# Patient Record
Sex: FEMALE | Race: WHITE
[De-identification: names, ages, dates, MRNs, and addresses within clinical notes are randomized per-mention and may not be internally consistent; named-entity substitution may affect disease eponyms.]

---

## 2017-10-12 ENCOUNTER — HOSPITAL ENCOUNTER (OUTPATIENT)
Dept: HOSPITAL 47 - RADMAMWWP | Age: 62
Discharge: HOME | End: 2017-10-12
Payer: COMMERCIAL

## 2017-10-12 DIAGNOSIS — Z12.31: Primary | ICD-10-CM

## 2017-10-16 NOTE — MM
Reason for exam: screening  (asymptomatic).

Last mammogram was performed 2 years and 10 months ago.



History:

Patient is postmenopausal.

Benign US biopsy breast VAD RT of the right breast, December 15, 2014.  

Stereotactic core biopsy, 2003.  Benign excisional biopsy of the right breast, 

1990.



Physical Findings:

A clinical breast exam by your physician is recommended on an annual basis and 

results should be correlated with mammographic findings.



MG Screening Mammo w CAD

Bilateral CC and MLO view(s) were taken.

Prior study comparison: December 10, 2014, bilateral MG diagnostic mammo w CAD 

[facility].  August 6, 2013, CAD bilateral diagnostic mammogram.

There are scattered fibroglandular densities.  Previous mammotome biopsy in the 

right breast. There is chronic nodularity in the right breast. Nodularity in the 

right upper outer quadrant appears slightly more defined.





ASSESSMENT: Incomplete: need additional imaging evaluation, BI-RAD 0



RECOMMENDATION:

Special view mammogram and ultrasound of the right breast.



[facility] will attempt to contact patient to return for supplemental 

views and ultrasound.

## 2017-10-19 ENCOUNTER — HOSPITAL ENCOUNTER (OUTPATIENT)
Dept: HOSPITAL 47 - RADMAMWWP | Age: 62
End: 2017-10-19
Payer: COMMERCIAL

## 2017-10-19 DIAGNOSIS — R92.8: Primary | ICD-10-CM

## 2017-10-19 PROCEDURE — 76642 ULTRASOUND BREAST LIMITED: CPT

## 2017-10-19 NOTE — MM
Reason for exam: additional evaluation requested from abnormal screening.

Last mammogram was performed less than 1 month ago.



History:

Patient is postmenopausal.

Benign US biopsy breast VAD RT of the right breast, December 15, 2014.  

Stereotactic core biopsy, 2003.  Benign excisional biopsy of the right breast, 

1990.



Physical Findings:

Nurse Summary: 2cm nodule in the right breast at 5 o'clock (nurse shannon).



MG Work Up Mamm w CAD RT

Spot compression CC, spot compression MLO, and LM view(s) were taken of the right 

breast.

Prior study comparison: October 12, 2017, bilateral MG screening mammo w CAD.  

December 10, 2014, bilateral MG diagnostic mammo w CAD MARISSA.

Previous mammotome biopsy in the right breast. There is chronic nodularity in the 

right breast, maybe better defined with improved technique.

No significant new findings when compared with previous films.



These results were verbally communicated with the patient and result sheet given 

to the patient on 10/19/17.





ASSESSMENT: Probably benign, BI-RAD 3



RECOMMENDATION:

Follow-up diagnostic mammogram and ultrasound of the right breast in 6 months.

## 2017-10-19 NOTE — USB
Reason for exam: additional evaluation requested from abnormal screening.



History:

Patient is postmenopausal.

Benign US biopsy breast VAD RT of the right breast, December 15, 2014.  

Stereotactic core biopsy, 2003.  Benign excisional biopsy of the right breast, 

1990.



US Breast Workup Limited RT

Right breast ultrasound demonstrates two oval, mixed, hypoechoic lesions at 9 

o'clock measuring 7 x 3 x 6mm and 7 x 3 x 6mm.



These results were verbally communicated with the patient and result sheet given 

to the patient on 10/19/17.





ASSESSMENT: Probably benign, BI-RAD 3



RECOMMENDATION:

Follow-up diagnostic mammogram and ultrasound of the right breast in 6 months.

## 2020-12-04 ENCOUNTER — HOSPITAL ENCOUNTER (OUTPATIENT)
Dept: HOSPITAL 47 - RADMAMWWP | Age: 65
Discharge: HOME | End: 2020-12-04
Attending: INTERNAL MEDICINE
Payer: COMMERCIAL

## 2020-12-04 DIAGNOSIS — N63.10: Primary | ICD-10-CM

## 2020-12-04 DIAGNOSIS — N63.20: ICD-10-CM

## 2020-12-04 PROCEDURE — 77066 DX MAMMO INCL CAD BI: CPT

## 2020-12-04 NOTE — MM
Reason for exam: additional evaluation requested from prior study.

Last mammogram was performed 3 years and 2 months ago.



History:

Patient is postmenopausal.

Benign US biopsy breast VAD RT of the right breast, December 15, 2014.  

Stereotactic core biopsy, 2003.  Benign excisional biopsy of the right breast, 

1990.



Physical Findings:

Nurse Summary: 2.5cm nodule in the right breast at 4 o'clock (nurse TM).



MG Diagnostic Mammo w CAD [facility]ISSA

Bilateral CC and MLO view(s) were taken.  XCCL view(s) were taken of the right 

breast.

Prior study comparison: October 19, 2017, right breast MG work up mamm w CAD RT.  

October 12, 2017, bilateral MG screening mammo w CAD.

The breast tissue is heterogeneously dense. This may lower the sensitivity of 

mammography.  Previous mammotome biopsy in the right breast, large nodule with 

dystrophic calcifications, corresponds to palpable. There is chronic nodularity in

the right breast. Asymmetric breast tissue left axilla, stable. There is no 

discrete abnormality.



These results were verbally communicated with the patient and result sheet given 

to the patient on 12/4/20.





ASSESSMENT: Benign, BI-RAD 2



RECOMMENDATION:

Routine screening mammogram of both breasts in 1 year.

## 2023-01-25 ENCOUNTER — HOSPITAL ENCOUNTER (OUTPATIENT)
Dept: HOSPITAL 47 - RADBDWWP | Age: 68
Discharge: HOME | End: 2023-01-25
Attending: INTERNAL MEDICINE
Payer: MEDICARE

## 2023-01-25 DIAGNOSIS — Z13.820: Primary | ICD-10-CM

## 2023-01-25 DIAGNOSIS — M06.9: ICD-10-CM

## 2023-01-25 DIAGNOSIS — K21.9: ICD-10-CM

## 2023-01-25 PROCEDURE — 77080 DXA BONE DENSITY AXIAL: CPT

## 2023-01-25 NOTE — BD
EXAMINATION TYPE: Axial Bone Density

 

DATE OF EXAM: 1/25/2023

 

COMPARISON: 08.06.2013

 

CLINICAL HISTORY: 67 years year old Female.  ICD-10 CODE: Z13.820 SCREENING FOR OSTEO

 

Height:  66

Weight:  214

 

FRAX RISK QUESTIONS:

Family History (Parent hip fracture):  YES

Rheumatoid Arthritis: YES

 

RISK FACTORS 

HISTORY OF: 

Family History of Osteoporosis: YES, WITH FX FEMUR

Postmenopausal woman: YES, AT 51 YRS OLD

Hyperparathyroidism: NO

Adrenal Insufficiency: NO

 

MEDICATIONS: 

Prednisone or other steroids: ON AND OFF FOR RA

Additional Medications: METHOTREXATE, REFLUX MEDS, VIT D AND CALCIUM

Additional History: RA, REFLUX,  

 

EXAM MEASUREMENTS: 

Bone mineral densitometry was performed using the Fancred System.

Bone mineral density as measured about the Lumbar spine is:  

----- L1-L4(G/cm2): 1.204

T Score Values are as follows:

----- L1:    0.2

----- L2:    0.0

----- L3:   -0.2

----- L4:    0.7

----- L1-L4:    0.2

Bone mineral density has: Decreased -2.7% SINCE....08.06.2013

 

Bone mineral density about the R hip (g/cm2): 0.936

Bone mineral density about the L hip (g/cm2):  0.949

T Score values are as follows:

-----R Neck:   -0.8

-----L Neck:   -0.9

-----R Total:   -0.6

-----L Total:   -0.5

Bone mineral density has: Decreased -5.5%   SINCE:  08.06.2013

 

FRAX%s: The graph provided illustrates a 16.5%nce for a major osteoporotic fx and a 1.8%ance for the 
hips probability for fx in 10 years time.

 

IMPRESSION:

Normal (Values between +1 and -1 indicate normal bone mass).  Consider repeating this study in 5 year
s or sooner if there is some new clinical indication.

 

NOTE:  T-SCORE=SD OF THE YOUNG ADULT MEAN.

## 2023-04-19 ENCOUNTER — HOSPITAL ENCOUNTER (EMERGENCY)
Dept: HOSPITAL 47 - EC | Age: 68
Discharge: HOME | End: 2023-04-19
Payer: MEDICARE

## 2023-04-19 VITALS — RESPIRATION RATE: 14 BRPM

## 2023-04-19 VITALS — SYSTOLIC BLOOD PRESSURE: 144 MMHG | HEART RATE: 74 BPM | DIASTOLIC BLOOD PRESSURE: 85 MMHG

## 2023-04-19 VITALS — TEMPERATURE: 98.1 F

## 2023-04-19 DIAGNOSIS — Z88.1: ICD-10-CM

## 2023-04-19 DIAGNOSIS — Z79.899: ICD-10-CM

## 2023-04-19 DIAGNOSIS — M06.9: ICD-10-CM

## 2023-04-19 DIAGNOSIS — I10: Primary | ICD-10-CM

## 2023-04-19 LAB
ALBUMIN SERPL-MCNC: 4.4 G/DL (ref 3.5–5)
ALP SERPL-CCNC: 73 U/L (ref 38–126)
ALT SERPL-CCNC: 18 U/L (ref 4–34)
ANION GAP SERPL CALC-SCNC: 5 MMOL/L
AST SERPL-CCNC: 27 U/L (ref 14–36)
BASOPHILS # BLD AUTO: 0 K/UL (ref 0–0.2)
BASOPHILS NFR BLD AUTO: 1 %
BUN SERPL-SCNC: 29 MG/DL (ref 7–17)
CALCIUM SPEC-MCNC: 9.5 MG/DL (ref 8.4–10.2)
CHLORIDE SERPL-SCNC: 106 MMOL/L (ref 98–107)
CO2 SERPL-SCNC: 28 MMOL/L (ref 22–30)
EOSINOPHIL # BLD AUTO: 0.1 K/UL (ref 0–0.7)
EOSINOPHIL NFR BLD AUTO: 2 %
ERYTHROCYTE [DISTWIDTH] IN BLOOD BY AUTOMATED COUNT: 4.21 M/UL (ref 3.8–5.4)
ERYTHROCYTE [DISTWIDTH] IN BLOOD: 14.1 % (ref 11.5–15.5)
GLUCOSE SERPL-MCNC: 87 MG/DL (ref 74–99)
HCT VFR BLD AUTO: 38.9 % (ref 34–46)
HGB BLD-MCNC: 13.2 GM/DL (ref 11.4–16)
LYMPHOCYTES # SPEC AUTO: 1.1 K/UL (ref 1–4.8)
LYMPHOCYTES NFR SPEC AUTO: 28 %
MCH RBC QN AUTO: 31.5 PG (ref 25–35)
MCHC RBC AUTO-ENTMCNC: 34 G/DL (ref 31–37)
MCV RBC AUTO: 92.5 FL (ref 80–100)
MONOCYTES # BLD AUTO: 0.3 K/UL (ref 0–1)
MONOCYTES NFR BLD AUTO: 8 %
NEUTROPHILS # BLD AUTO: 2.4 K/UL (ref 1.3–7.7)
NEUTROPHILS NFR BLD AUTO: 59 %
PLATELET # BLD AUTO: 221 K/UL (ref 150–450)
POTASSIUM SERPL-SCNC: 4.6 MMOL/L (ref 3.5–5.1)
PROT SERPL-MCNC: 7.4 G/DL (ref 6.3–8.2)
SODIUM SERPL-SCNC: 139 MMOL/L (ref 137–145)
WBC # BLD AUTO: 4.1 K/UL (ref 3.8–10.6)

## 2023-04-19 PROCEDURE — 71046 X-RAY EXAM CHEST 2 VIEWS: CPT

## 2023-04-19 PROCEDURE — 93005 ELECTROCARDIOGRAM TRACING: CPT

## 2023-04-19 PROCEDURE — 36415 COLL VENOUS BLD VENIPUNCTURE: CPT

## 2023-04-19 PROCEDURE — 85025 COMPLETE CBC W/AUTO DIFF WBC: CPT

## 2023-04-19 PROCEDURE — 99284 EMERGENCY DEPT VISIT MOD MDM: CPT

## 2023-04-19 PROCEDURE — 80053 COMPREHEN METABOLIC PANEL: CPT

## 2023-04-19 NOTE — XR
EXAMINATION TYPE: XR chest 2V

 

DATE OF EXAM: 4/19/2023 7:20 PM

 

COMPARISON: None

 

TECHNIQUE: XR chest 2V Frontal and lateral views of the chest.

 

CLINICAL INDICATION:Female, 67 years old with history of elevated BP; 

 

FINDINGS: 

Lungs/Pleura: There is flattening of the diaphragm with increased lucency of the lungs. No evidence o
f pneumothorax, pleural effusion or focal consolidation. 

Pulmonary vascularity: Unremarkable.

Heart/mediastinum: Cardiomediastinal silhouette is unremarkable.

Musculoskeletal: No acute osseous pathology.

 

IMPRESSION: 

1. No acute cardiopulmonary disease process.

 

2. COPD changes.

## 2023-04-19 NOTE — ED
General Adult HPI





- General


Source: patient


Mode of arrival: ambulatory


Limitations: no limitations





<Soheila García - Last Filed: 04/19/23 18:26>





<Allen Coffman - Last Filed: 04/19/23 20:01>





- General


Source: patient, family, RN notes reviewed


Mode of arrival: ambulatory


Limitations: no limitations





<Radha Haider - Last Filed: 04/19/23 21:20>





- General


Chief complaint: Recheck/Abnormal Lab/Rx


Stated complaint: juan blood pressure


Time Seen by Provider: 04/19/23 18:26





- History of Present Illness


Initial comments: 


Patient is a 67-year-old female presents to the emergency department for high bl

ood pressure.  Patient states she has history she does not take any medication 

for it.  She reports a mild headache otherwise feels well.  No chest pain or 

shortness of breath.  No lightheadedness, dizziness, blurry vision.


 (Soheila García)


This is a 67-year-old female who presents to the emergency department for 

concerns of elevated blood pressure.  She is not currently being treated for 

hypertension.  States that over the last week she has been checking her blood 

pressure at home, and she has noticed that her blood pressure has been elevated.

 She has measured it in the 150s to 160s systolically and when she was at the 

dentist earlier this week it was 105 diastolically, however she cannot recall 

the systolic number at that time.  She has started to notice some headaches over

the last couple of days.  Denies any spots in her vision or visual changes.  She

also denies any chest pain or shortness of breath.  She does report that she 

eats more salt than she should. 





Denies any fevers, chills, sore throat, cough, dyspnea, chest pain, 

palpitations, abdominal pain, nausea, vomiting, diarrhea, or back pain.


 (Radha Haider)





- Related Data


                                Home Medications











 Medication  Instructions  Recorded  Confirmed


 


Adalimumab [Humira Pen] 40 mg SQ [id number] 10/29/14 10/29/14


 


Antiarthritic Combination No.2 1 tab PO DAILY 10/29/14 10/29/14





[Glucosamine-Chondroitin]   


 


Calcium Carbonate/Vitamin D3 1 tab PO DAILY 10/29/14 10/29/14





[Calcium 600-Vit D3 400 Tablet]   


 


Cetirizine HCl [Zyrtec] 10 mg PO DAILY 10/29/14 10/29/14


 


Doterra Vitamin Pack 1 applic PO DAILY 10/29/14 10/29/14


 


Folic Acid 0.8 mg PO DAILY 10/29/14 10/29/14


 


Multivitamins, Thera [Multivitamin 1 tab PO DAILY 10/29/14 10/29/14





(formulary)]   


 


metHOTREXate sodium [Methotrexate] 12.5 mg PO WE 10/29/14 10/29/14








                                  Previous Rx's











 Medication  Instructions  Recorded


 


Levofloxacin 750Mg-D5w Pmx 750 mg IVPB Q24H #7 bag 11/01/14





[Levaquin 750Mg-D5w Pmx]  


 


metroNIDAZOLE [Flagyl] 500 mg PO TID #30 tab 11/01/14


 


amLODIPine [Norvasc] 5 mg PO DAILY #15 tab 04/19/23











                                    Allergies











Allergy/AdvReac Type Severity Reaction Status Date / Time


 


clindamycin Allergy  Rash/Hives Verified 04/19/23 17:31














Review of Systems


ROS Other: All systems not noted in ROS Statement are negative.





<Soheila García - Last Filed: 04/19/23 18:26>


ROS Other: All systems not noted in ROS Statement are negative.





<Allen Coffman - Last Filed: 04/19/23 20:01>


ROS Other: All systems not noted in ROS Statement are negative.





<Radha Haider - Last Filed: 04/19/23 21:20>


ROS Statement: 


Those systems with pertinent positive or pertinent negative responses have been 

documented in the HPI.








Past Medical History


Past Medical History: Hypertension, Rheumatoid Arthritis (RA)


Additional Past Medical History / Comment(s): UTI, DIVERTICULITIS


History of Any Multi-Drug Resistant Organisms: None Reported


Past Surgical History: Adenoidectomy, Orthopedic Surgery, Tonsillectomy


Additional Past Surgical History / Comment(s): right rotator cuff repair


Past Anesthesia/Blood Transfusion Reactions: Motion Sickness


Past Psychological History: No Psychological Hx Reported


Smoking Status: Never smoker


Past Alcohol Use History: Occasional


Past Drug Use History: None Reported





- Past Family History


  ** Father


Family Medical History: GERD/Reflux, Prostate Disorder


Additional Family Medical History / Comment(s): DAD IS 85, VERTIGO, ULCERS





  ** Mother


Family Medical History: Eye Disorder, Hypertension, Renal Disease


Additional Family Medical History / Comment(s): MOM IS 85 IN W/C AFTER BACK SX





<Soheila García - Last Filed: 04/19/23 18:26>





General Exam


Limitations: no limitations





<Soheila García - Last Filed: 04/19/23 18:26>


Limitations: no limitations


General appearance: alert, in no apparent distress


Head exam: Present: atraumatic, normocephalic, normal inspection


Eye exam: Present: normal appearance, PERRL, EOMI.  Absent: scleral icterus, 

conjunctival injection, periorbital swelling


Respiratory exam: Present: normal lung sounds bilaterally.  Absent: respiratory 

distress, wheezes, rales, rhonchi, stridor


Cardiovascular Exam: Present: regular rate, normal rhythm, normal heart sounds. 

 Absent: systolic murmur, diastolic murmur, rubs, gallop, clicks


Neurological exam: Present: alert, oriented X3, CN II-XII intact


Psychiatric exam: Present: normal affect, normal mood


Skin exam: Present: warm, dry, intact, normal color.  Absent: rash





<Radha Haider - Last Filed: 04/19/23 21:20>





- General Exam Comments


Initial Comments: 


Visual Physical Exam





Vital signs reviewed





General: Well-appearing, nontoxic, no acute distress.


Head: Normocephalic, atraumatic


Eyes: PERRLA, EOMI


ENT: Airway patent


Chest: Nonlabored breathing


Skin: No visual rash, normal skin tone


Neuro: Alert and oriented 3


Musculoskeletal: No gross abnormalities


 (Soheila García)





Course


                                   Vital Signs











  04/19/23 04/19/23





  17:29 21:16


 


Temperature 98.1 F 


 


Pulse Rate 79 72


 


Respiratory 20 14





Rate  


 


Blood Pressure 144/92 161/97


 


O2 Sat by Pulse 97 98





Oximetry  














EKG Findings





- EKG Results:


EKG: interpreted by ERMD (EKG interpreted by me normal sinus rhythm a 71.  

Interval 146 QRS duration 106 QT since /45 no acute ST-T wave changes)





<Allen Coffman - Last Filed: 04/19/23 20:01>





Medical Decision Making





- Lab Data


Result diagrams: 


                                 04/19/23 20:15





                                 04/19/23 20:15





- Radiology Data


Radiology results: report reviewed, image reviewed





<Radha Haider - Last Filed: 04/19/23 21:20>





- Medical Decision Making


Is a 67-year-old female who presents emergency department for concerns of 

elevated blood pressure.





Was pt. sent in by a medical professional or institution?


@  -No   


Did you speak to anyone other than the patient for history?  


@  -No


Did you review nursing and triage notes? 


@  -Yes, and I agree, it is accurate with regards to the patient's symptoms.


Were old charts reviewed? 


@  -No


Differential Diagnosis? 


@  -Differential elevated BP:


EKG interpreted by me (3pts min.)?


@  -[none]


X-rays interpreted by me (1pt min.)?


@  -[none]


CT interpreted by me (1pt min.)?


@  -[none]


U/S interpreted by me (1pt. min.)?


@  -[none]


What testing was considered but not performed? (CT, X-rays, U/S, labs)? Why?


@  [CT, X-rays, U/S, labs? Why?]


What meds were considered but not given? Why?


@  -[none]


Did you discuss the management of the patient with other professionals?


@  -No


Did you reconcile home meds?


@  -No


Was smoking cessation discussed for >3mins.?


@  -No


Was critical care preformed (if so, how long)?


@  -No


Were there social determinants of health that impacted care today? How? (

Homelessness, low income, unemployed, alcoholism, drug addiction, 

transportation, low edu. Level, literacy, decrease access to med. care, alf, 

rehab)?


@  -No


Was there de-escalation of care discussed even if they declined? (Discuss DNR or

 withdrawal of care, Hospice)?


@  -No


What co-morbidities impacted this encounter? (DM, HTN, Smoking, COPD, CAD, 

Cancer, CVA, Hep., AIDS, mental health diagnosis, sleep apnea, morbid obesity)?


@  -[DM, HTN, Smoking, COPD, CAD, Cancer, CVA, Hep., AIDS, mental health di

agnosis, sleep apnea, morbid obesity?]


Was patient admitted / discharged?


@  -[hospital course] 


Undiagnosed new problem with uncertain prognosis?


@  -None


Drug Therapy requiring intensive monitoring for toxicity (Heparin, Nitro, 

Insulin, Cardizem)?


@  -None


Were any procedures done?


@  -None


Diagnosis/symptom?


@  -[default]


Acute, or Chronic, or Acute on Chronic?


@  -[default]


Uncomplicated (without systemic symptoms) or Complicated (systemic symptoms)?


@  -[default]


Side effects of treatment?


@  -[none]


Exacerbation, Progression, or Severe Exacerbation]


@  -Not applicable


Poses a threat to life or bodily function?


@  -[no]


 (Radha Haider)





- Lab Data


                                   Lab Results











  04/19/23 04/19/23 Range/Units





  20:15 20:15 


 


WBC  4.1   (3.8-10.6)  k/uL


 


RBC  4.21   (3.80-5.40)  m/uL


 


Hgb  13.2   (11.4-16.0)  gm/dL


 


Hct  38.9   (34.0-46.0)  %


 


MCV  92.5   (80.0-100.0)  fL


 


MCH  31.5   (25.0-35.0)  pg


 


MCHC  34.0   (31.0-37.0)  g/dL


 


RDW  14.1   (11.5-15.5)  %


 


Plt Count  221   (150-450)  k/uL


 


MPV  7.1   


 


Neutrophils %  59   %


 


Lymphocytes %  28   %


 


Monocytes %  8   %


 


Eosinophils %  2   %


 


Basophils %  1   %


 


Neutrophils #  2.4   (1.3-7.7)  k/uL


 


Lymphocytes #  1.1   (1.0-4.8)  k/uL


 


Monocytes #  0.3   (0-1.0)  k/uL


 


Eosinophils #  0.1   (0-0.7)  k/uL


 


Basophils #  0.0   (0-0.2)  k/uL


 


Sodium   139  (137-145)  mmol/L


 


Potassium   4.6  (3.5-5.1)  mmol/L


 


Chloride   106  ()  mmol/L


 


Carbon Dioxide   28  (22-30)  mmol/L


 


Anion Gap   5  mmol/L


 


BUN   29 H  (7-17)  mg/dL


 


Creatinine   1.02  (0.52-1.04)  mg/dL


 


Est GFR (CKD-EPI)AfAm   65  (>60 ml/min/1.73 sqM)  


 


Est GFR (CKD-EPI)NonAf   57  (>60 ml/min/1.73 sqM)  


 


Glucose   87  (74-99)  mg/dL


 


Calcium   9.5  (8.4-10.2)  mg/dL


 


Total Bilirubin   0.3  (0.2-1.3)  mg/dL


 


AST   27  (14-36)  U/L


 


ALT   18  (4-34)  U/L


 


Alkaline Phosphatase   73  ()  U/L


 


Total Protein   7.4  (6.3-8.2)  g/dL


 


Albumin   4.4  (3.5-5.0)  g/dL














Disposition





<Soheila García - Last Filed: 04/19/23 18:26>





<Allen Coffman - Last Filed: 04/19/23 20:01>


Is patient prescribed a controlled substance at d/c from ED?: No





<Radha Haider - Last Filed: 04/19/23 21:20>


Clinical Impression: 


 Hypertension





Disposition: HOME SELF-CARE


Instructions (If sedation given, give patient instructions):  Hypertension (ED)


Additional Instructions: 


Return to the emergency department with any new, worsening, or concerning 

symptoms. Take the amlodipine daily.  Check your blood pressure several times 

each day and keep a log of the blood pressure.  Follow up with your primary care

provider as scheduled on Friday and make sure you bring the log of your blood 

pressures with you.  


Prescriptions: 


amLODIPine [Norvasc] 5 mg PO DAILY #15 tab


Referrals: 


Allen Quintanilla DO [Primary Care Provider] - 1-2 days